# Patient Record
Sex: MALE | Race: WHITE | NOT HISPANIC OR LATINO | URBAN - METROPOLITAN AREA
[De-identification: names, ages, dates, MRNs, and addresses within clinical notes are randomized per-mention and may not be internally consistent; named-entity substitution may affect disease eponyms.]

---

## 2020-12-04 ENCOUNTER — EMERGENCY (EMERGENCY)
Facility: HOSPITAL | Age: 45
LOS: 1 days | Discharge: HOME | End: 2020-12-04
Attending: EMERGENCY MEDICINE | Admitting: EMERGENCY MEDICINE
Payer: COMMERCIAL

## 2020-12-04 VITALS
SYSTOLIC BLOOD PRESSURE: 130 MMHG | RESPIRATION RATE: 18 BRPM | OXYGEN SATURATION: 98 % | DIASTOLIC BLOOD PRESSURE: 85 MMHG | HEART RATE: 71 BPM | TEMPERATURE: 98 F

## 2020-12-04 PROCEDURE — 99283 EMERGENCY DEPT VISIT LOW MDM: CPT

## 2020-12-04 NOTE — ED PROVIDER NOTE - PATIENT PORTAL LINK FT
You can access the FollowMyHealth Patient Portal offered by [facility] by registering at the following website: [email or web address]. By joining [employer]’s FollowMyHealth portal, you will also be able to view your health information using other applications (apps) compatible with our system.

## 2020-12-04 NOTE — ED PROVIDER NOTE - NSFOLLOWUPINSTRUCTIONS_ED_ALL_ED_FT
THE COVID 19 TEST RESULTS  - results may take up to 2-3 days to become available   - if you have consented, you will receive your results electronically   -  you can check [place] [facility] or call [phone] to discuss your results with our nursing staff    Please continue to self quarantine (home isolation) until your result is back and follow instructions accordingly  - positive: complete home isolation for a total of 14 days since day of testing and no more fever with feeling back to baseline   - negative: you will be able to stop home isolation but still practice standard precautions, similar to how you would manage a regular flu/cold.    Return to ER for any worsening symptoms, such as persistent fever >100.4F, shortness of breath, coughing up bloody sputum, chest pain, lethargy, and fainting    Please remember to wash your hands frequently (>20 seconds each time), avoid touching your face, and cover your cough/sneeze.  Always wear a mask when you are outside of your home and practice social distancing.    Only take Tylenol for fever/pain control and avoid NSAIDs (ibuprofen/Advil/Aleve/naproxen) due to potential increased risk of exacerbating COVID-19 infection

## 2020-12-08 DIAGNOSIS — Z20.828 CONTACT WITH AND (SUSPECTED) EXPOSURE TO OTHER VIRAL COMMUNICABLE DISEASES: ICD-10-CM

## 2020-12-19 ENCOUNTER — EMERGENCY (EMERGENCY)
Facility: HOSPITAL | Age: 45
LOS: 1 days | Discharge: HOME | End: 2020-12-19
Attending: EMERGENCY MEDICINE | Admitting: EMERGENCY MEDICINE
Payer: COMMERCIAL

## 2020-12-19 VITALS
HEART RATE: 60 BPM | TEMPERATURE: 98 F | DIASTOLIC BLOOD PRESSURE: 69 MMHG | SYSTOLIC BLOOD PRESSURE: 128 MMHG | OXYGEN SATURATION: 98 % | RESPIRATION RATE: 18 BRPM

## 2020-12-19 DIAGNOSIS — Z20.828 CONTACT WITH AND (SUSPECTED) EXPOSURE TO OTHER VIRAL COMMUNICABLE DISEASES: ICD-10-CM

## 2020-12-19 PROCEDURE — 99283 EMERGENCY DEPT VISIT LOW MDM: CPT

## 2020-12-19 NOTE — ED PROVIDER NOTE - OBJECTIVE STATEMENT
45 male, presenting to the ED requesting COVID-19 screening. Patient is currently asymptomatic and has no other medical complaints at this time. Denies fever, chills, chest pain, SOB.

## 2020-12-19 NOTE — ED PROVIDER NOTE - CLINICAL SUMMARY MEDICAL DECISION MAKING FREE TEXT BOX
Asymptomatic patient here for COVID screening. Risk of exposure is very low. Reviewed vitals and testing plan with the patient. Encouraged to download the follow my health greg for test results.

## 2020-12-19 NOTE — ED PROVIDER NOTE - PATIENT PORTAL LINK FT
You can access the FollowMyHealth Patient Portal offered by [facility] by registering at the following website: [email or web address]. By joining [employer]’s FollowMyHealth portal, you will also be able to view your health information using other applications (apps) compatible with our system.

## 2020-12-20 ENCOUNTER — EMERGENCY (EMERGENCY)
Facility: HOSPITAL | Age: 45
LOS: 1 days | Discharge: HOME | End: 2020-12-20
Attending: EMERGENCY MEDICINE | Admitting: EMERGENCY MEDICINE
Payer: COMMERCIAL

## 2020-12-20 VITALS
OXYGEN SATURATION: 95 % | RESPIRATION RATE: 17 BRPM | HEIGHT: 73 IN | WEIGHT: 195.11 LBS | TEMPERATURE: 98 F | HEART RATE: 63 BPM | DIASTOLIC BLOOD PRESSURE: 62 MMHG | SYSTOLIC BLOOD PRESSURE: 103 MMHG

## 2020-12-20 DIAGNOSIS — Z20.828 CONTACT WITH AND (SUSPECTED) EXPOSURE TO OTHER VIRAL COMMUNICABLE DISEASES: ICD-10-CM

## 2020-12-20 PROCEDURE — 99283 EMERGENCY DEPT VISIT LOW MDM: CPT

## 2020-12-20 NOTE — ED PROVIDER NOTE - PATIENT PORTAL LINK FT
You can access the FollowMyHealth Patient Portal offered by [facility] by registering at the following website: [email or web address]. By joining [employer]’s FollowMyHealth portal, you will also be able to view your health information using other applications (apps) compatible with our system.

## 2020-12-20 NOTE — ED ADULT TRIAGE NOTE - CHIEF COMPLAINT QUOTE
Pt presents to ED for covid test. Pt denies fever/chills/cough/n/v/d/cp or sob, known sick contacts or recent travel. Pt state he is set to travel to [place] on Tuesday. Pt speaking n full sentences without difficulty Pt presents to ED for covid test. Pt denies fever/chills/cough/n/v/d/cp or sob, known sick contacts. Pt state returned from [place] last week and is set to travel to [place] on Tuesday. Pt speaking n full sentences without difficulty

## 2020-12-20 NOTE — ED PROVIDER NOTE - CLINICAL SUMMARY MEDICAL DECISION MAKING FREE TEXT BOX
Asymptomatic patient presents for COVID testing.  Vitals reviewed.  PCR performed in the ED.  Discussed results expectations and reasons for ED return.

## 2020-12-21 LAB — SARS-COV-2 RNA SPEC QL NAA+PROBE: SIGNIFICANT CHANGE UP

## 2021-01-04 ENCOUNTER — EMERGENCY (EMERGENCY)
Facility: HOSPITAL | Age: 46
LOS: 1 days | Discharge: HOME | End: 2021-01-04
Attending: EMERGENCY MEDICINE | Admitting: EMERGENCY MEDICINE
Payer: COMMERCIAL

## 2021-01-04 VITALS
HEIGHT: 73 IN | DIASTOLIC BLOOD PRESSURE: 82 MMHG | HEART RATE: 78 BPM | TEMPERATURE: 98 F | OXYGEN SATURATION: 99 % | SYSTOLIC BLOOD PRESSURE: 132 MMHG | RESPIRATION RATE: 17 BRPM

## 2021-01-04 DIAGNOSIS — Z20.822 CONTACT WITH AND (SUSPECTED) EXPOSURE TO COVID-19: ICD-10-CM

## 2021-01-04 DIAGNOSIS — Z00.00 ENCOUNTER FOR GENERAL ADULT MEDICAL EXAMINATION WITHOUT ABNORMAL FINDINGS: ICD-10-CM

## 2021-01-04 PROCEDURE — 99283 EMERGENCY DEPT VISIT LOW MDM: CPT

## 2021-01-04 NOTE — ED PROVIDER NOTE - NSFOLLOWUPINSTRUCTIONS_ED_ALL_ED_FT
Your test results may take 1-3 days. You will get a text/email.  Please check the patient online portal ([employer] and website) for results. You can create a portal account at [email or web address]. [facility] [place]. If you have old records with [employer] or [facility]  or encounter any difficulties with us you will need to call the HELP line to merge results [phone] (Mon-Fri 8a-5p).    Please follow the instructions on provided coronavirus discharge educational forms and if needed self quarantine for 14 days.     If you test positive for COVID 19:    1. STAY HOME for 14 DAYS  2. Minimize human contact to ONLY ESSENTIAL  3. Every time you wash your hands, sing the HAPPY BIRTHDAY song so you know you're washing long enough.  Make sure to scrub the webspace between your fingers.  4. DRINK 1-3 Liters of fluids day x at least 5 days.  To remain hydrated. Your fatigue, lightheadedness, and body aches will decrease and your fever has a better chance of breaking if you are well hydrated.    5. For your Fever and Body aches takes Tylenol 650-100mg every 4-6h (max 4000mg/day). Try not to use ibuprofen, aspirin or naproxen (Advil, Motrin or Aleve) as these may worsen Coronavirus infection.  6. Use an inhaler for mild shortness of breath and cough  7. RETURN TO THE ER IMMEDIATELY IF YOU HAVE WORSENING SHORTNESS OF BREATH  8. TAKE THE FOLLOWING SUPPLEMENTS DAILY.        VITAMIN C 1000MG ONCE DAILY.        VITAMIN D 200IU ONCE DAILY.        ZINC 50MG ONCE DAILY.

## 2021-01-04 NOTE — ED PROVIDER NOTE - PATIENT PORTAL LINK FT
You can access the FollowMyHealth Patient Portal offered by [facility] by registering at the following website: [email or web address]. By joining [employer]’s FollowMyHealth portal, you will also be able to view your health information using other applications (apps) compatible with our system.

## 2021-01-04 NOTE — ED ADULT TRIAGE NOTE - CAS DISCH CONDITION
Stable
Patient has 3/1 commode and rolling walker at home. Patient will be given hip kit when O.T. performs initial occupational therapy evaluation.

## 2021-01-05 LAB — SARS-COV-2 RNA SPEC QL NAA+PROBE: SIGNIFICANT CHANGE UP

## 2021-02-04 ENCOUNTER — EMERGENCY (EMERGENCY)
Facility: HOSPITAL | Age: 46
LOS: 1 days | Discharge: HOME | End: 2021-02-04
Admitting: EMERGENCY MEDICINE
Payer: COMMERCIAL

## 2021-02-04 VITALS
HEIGHT: 73 IN | SYSTOLIC BLOOD PRESSURE: 129 MMHG | OXYGEN SATURATION: 95 % | RESPIRATION RATE: 18 BRPM | DIASTOLIC BLOOD PRESSURE: 73 MMHG | HEART RATE: 71 BPM | TEMPERATURE: 98 F

## 2021-02-04 DIAGNOSIS — Z20.822 CONTACT WITH AND (SUSPECTED) EXPOSURE TO COVID-19: ICD-10-CM

## 2021-02-04 PROCEDURE — 99282 EMERGENCY DEPT VISIT SF MDM: CPT

## 2021-02-04 NOTE — ED PROVIDER NOTE - OBJECTIVE STATEMENT
44yo otherwise healthy M presents today requesting covid screening. denies any symptoms including fever, chills, n/v/d, abd pain, cp, sob, sore throat, any other concerns. admits return from [place] last night. known covid positive exposures.

## 2021-02-04 NOTE — ED PROVIDER NOTE - PATIENT PORTAL LINK FT
You can access the FollowMyHealth Patient Portal offered by [facility] by registering at the following website: [email or web address]. By joining [employer]’s FollowMyHealth portal, you will also be able to view your health information using other applications (apps) compatible with our system.

## 2021-02-04 NOTE — ED PROVIDER NOTE - CLINICAL SUMMARY MEDICAL DECISION MAKING FREE TEXT BOX
pt presents for screening for covid. pt is asymptomatic. +recent travel . no known sick contacts. swab sent. consents to e results. will d/c. given pre-printed discharge instructions. verbalizes understanding. all questions answered.

## 2021-03-05 ENCOUNTER — EMERGENCY (EMERGENCY)
Facility: HOSPITAL | Age: 46
LOS: 1 days | Discharge: HOME | End: 2021-03-05
Attending: EMERGENCY MEDICINE | Admitting: EMERGENCY MEDICINE
Payer: COMMERCIAL

## 2021-03-05 VITALS
OXYGEN SATURATION: 95 % | RESPIRATION RATE: 16 BRPM | SYSTOLIC BLOOD PRESSURE: 117 MMHG | HEART RATE: 83 BPM | HEIGHT: 73 IN | DIASTOLIC BLOOD PRESSURE: 72 MMHG | TEMPERATURE: 98 F

## 2021-03-05 DIAGNOSIS — Z20.822 CONTACT WITH AND (SUSPECTED) EXPOSURE TO COVID-19: ICD-10-CM

## 2021-03-05 PROBLEM — Z78.9 OTHER SPECIFIED HEALTH STATUS: Chronic | Status: ACTIVE | Noted: 2021-02-04

## 2021-03-05 PROCEDURE — 99282 EMERGENCY DEPT VISIT SF MDM: CPT

## 2021-03-05 NOTE — ED PROVIDER NOTE - PATIENT PORTAL LINK FT
You can access the FollowMyHealth Patient Portal offered by [facility] by registering at the following website: [email or web address]. By joining [employer]’s FollowMyHealth portal, you will also be able to view your health information using other applications (apps) compatible with our system.

## 2021-03-05 NOTE — ED PROVIDER NOTE - OBJECTIVE STATEMENT
44 y/o M presents to the ED requesting to have testing done for COVID-19. Pt recently returned from travels in [place] about 5 days ago. He was tested (-) before his flight. He came to the ED for repeat testing. He denies fevers, chills, coughs, CP, and SOB.

## 2021-03-06 LAB — SARS-COV-2 RNA SPEC QL NAA+PROBE: SIGNIFICANT CHANGE UP

## 2022-06-20 NOTE — ED PROVIDER NOTE - DISPOSITION TYPE
DISCHARGE Prednisone Counseling:  I discussed with the patient the risks of prolonged use of prednisone including but not limited to weight gain, insomnia, osteoporosis, mood changes, diabetes, susceptibility to infection, glaucoma and high blood pressure.  In cases where prednisone use is prolonged, patients should be monitored with blood pressure checks, serum glucose levels and an eye exam.  Additionally, the patient may need to be placed on GI prophylaxis, PCP prophylaxis, and calcium and vitamin D supplementation and/or a bisphosphonate.  The patient verbalized understanding of the proper use and the possible adverse effects of prednisone.  All of the patient's questions and concerns were addressed.

## 2024-01-02 PROBLEM — Z00.00 ENCOUNTER FOR PREVENTIVE HEALTH EXAMINATION: Status: ACTIVE | Noted: 2024-01-02

## 2024-01-03 ENCOUNTER — APPOINTMENT (OUTPATIENT)
Dept: UROLOGY | Facility: CLINIC | Age: 49
End: 2024-01-03
Payer: COMMERCIAL

## 2024-01-03 VITALS
HEIGHT: 73 IN | BODY MASS INDEX: 25.84 KG/M2 | HEART RATE: 76 BPM | SYSTOLIC BLOOD PRESSURE: 122 MMHG | TEMPERATURE: 97.52 F | DIASTOLIC BLOOD PRESSURE: 78 MMHG | OXYGEN SATURATION: 96 % | WEIGHT: 195 LBS

## 2024-01-03 PROCEDURE — 51798 US URINE CAPACITY MEASURE: CPT

## 2024-01-03 PROCEDURE — 81003 URINALYSIS AUTO W/O SCOPE: CPT | Mod: QW

## 2024-01-03 PROCEDURE — 99204 OFFICE O/P NEW MOD 45 MIN: CPT

## 2024-01-05 NOTE — PHYSICAL EXAM
[Normal Appearance] : normal appearance [Well Groomed] : well groomed [General Appearance - In No Acute Distress] : no acute distress [Edema] : no peripheral edema [Respiration, Rhythm And Depth] : normal respiratory rhythm and effort [Exaggerated Use Of Accessory Muscles For Inspiration] : no accessory muscle use [Abdomen Soft] : soft [Abdomen Tenderness] : non-tender [Costovertebral Angle Tenderness] : no ~M costovertebral angle tenderness [Urinary Bladder Findings] : the bladder was normal on palpation [Testes Tenderness] : no tenderness of the testes [Testes Mass (___cm)] : there were no testicular masses [Prostate Enlargement] : the prostate was not enlarged [Prostate Tenderness] : the prostate was not tender [No Prostate Nodules] : no prostate nodules [Normal Station and Gait] : the gait and station were normal for the patient's age [] : no rash [No Focal Deficits] : no focal deficits [Oriented To Time, Place, And Person] : oriented to person, place, and time [Affect] : the affect was normal [Mood] : the mood was normal

## 2024-01-05 NOTE — HISTORY OF PRESENT ILLNESS
[FreeTextEntry1] : 48 year old man with long history of intermittent gross hematuria and hematospermia. Episodes tend to occur after prolonged erections and arousal. No pain with ejaculation or orgasm. He has been treated multiple times for prostatitis with prolonged courses of antibiotics. When he does take antibiotics, the hematospermia takes a long time to resolve. Eventually recurs. Very distressed by this.   He had cystoscopy for hematuria that was unremarkable. He had CT as well that was normal. Has not had prostate MRI. Unsure of prior PSA values.  He has erectile dysfunction. He takes tadalafil for this, somewhat helpful. Hematospermia/hematuria do not occur more often after he takes tadalafil.  He has baseline moderate LUTS that are not particularly bothersome. He has frequency, incomplete emptying, weaker stream. Precribed daily tadalafil for this, but not particularly helpful. Overall, wants to focus on hematospermia.  IPSS 15, QOL 3,  cc  PMH: None PSH: None Meds: Tadalafil NKDA FH: Breast/ovarian cancer, HTN SH: Never drinker, former smoker

## 2024-01-05 NOTE — LETTER BODY
[Dear  ___] : Dear  [unfilled], [Courtesy Letter:] : I had the pleasure of seeing your patient, [unfilled], in my office today. [Please see my note below.] : Please see my note below. [Consult Closing:] : Thank you very much for allowing me to participate in the care of this patient.  If you have any questions, please do not hesitate to contact me. [Sincerely,] : Sincerely, [FreeTextEntry3] : Mckay Jasso MD

## 2024-01-05 NOTE — ASSESSMENT
[FreeTextEntry1] : 48 year old man with long history of intermittent gross hematuria and hematospermia that occurs after arousal. Negative evaluation for hematuria in the past. Differential includes UTI, STD, prostatitis, calcifications, vascular malformations, malignancy. Discussed infectious evaluation. Will plan for prostate MRI for more detailed evaluation of the prostate. If patient has recurrent hematuria, will try to perform cystoscopy at that time to find an identifiable source of bleeding. Can consider finasteride to reduce vascularity of the prostate, however, will need to balance with sexual side effects.   - UA, UCx, GC/CT, ureaplasma, trichomonas  - Semen culture  - Prostate MRI  - Urine cytology, FISH  - PSA

## 2024-01-10 LAB
APPEARANCE: CLEAR
BACTERIA UR CULT: NORMAL
BACTERIA: NEGATIVE /HPF
BILIRUB UR QL STRIP: NORMAL
BILIRUBIN URINE: NEGATIVE
BLOOD URINE: NEGATIVE
C TRACH RRNA SPEC QL NAA+PROBE: NOT DETECTED
CAST: 0 /LPF
CLARITY UR: CLEAR
COLLECTION METHOD: NORMAL
COLOR: YELLOW
EPITHELIAL CELLS: 0 /HPF
GLUCOSE QUALITATIVE U: NEGATIVE MG/DL
GLUCOSE UR-MCNC: NORMAL
HCG UR QL: 0.2 EU/DL
HGB UR QL STRIP.AUTO: NORMAL
HLX UV FISH FINAL REPORT: NORMAL
KETONES UR-MCNC: NORMAL
KETONES URINE: NEGATIVE MG/DL
LEUKOCYTE ESTERASE UR QL STRIP: NORMAL
LEUKOCYTE ESTERASE URINE: NEGATIVE
MICROSCOPIC-UA: NORMAL
MYCOPLASMA GENITALIUM PCR: NEGATIVE
MYCOPLASMA GENITALIUM SRCE: NORMAL
MYCOPLASMA HOMINIS CULTURE: NEGATIVE
N GONORRHOEA RRNA SPEC QL NAA+PROBE: NOT DETECTED
NITRITE UR QL STRIP: NORMAL
NITRITE URINE: NEGATIVE
PH UR STRIP: 8
PH URINE: 8
PROT UR STRIP-MCNC: NORMAL
PROTEIN URINE: NEGATIVE MG/DL
PSA SERPL-MCNC: 1.52 NG/ML
RED BLOOD CELLS URINE: 0 /HPF
SOURCE AMPLIFICATION: NORMAL
SOURCE AMPLIFICATION: NORMAL
SP GR UR STRIP: 1.01
SPECIFIC GRAVITY URINE: 1.01
T VAGINALIS RRNA SPEC QL NAA+PROBE: NOT DETECTED
UREAPLASMA CULTURE: NEGATIVE
URINE CYTOLOGY: NORMAL
UROBILINOGEN URINE: 0.2 MG/DL
WHITE BLOOD CELLS URINE: 0 /HPF

## 2024-01-11 ENCOUNTER — TRANSCRIPTION ENCOUNTER (OUTPATIENT)
Age: 49
End: 2024-01-11

## 2024-01-12 ENCOUNTER — OUTPATIENT (OUTPATIENT)
Dept: OUTPATIENT SERVICES | Facility: HOSPITAL | Age: 49
LOS: 1 days | End: 2024-01-12

## 2024-01-12 ENCOUNTER — APPOINTMENT (OUTPATIENT)
Dept: MRI IMAGING | Facility: CLINIC | Age: 49
End: 2024-01-12
Payer: COMMERCIAL

## 2024-01-12 ENCOUNTER — RESULT REVIEW (OUTPATIENT)
Age: 49
End: 2024-01-12

## 2024-01-12 PROCEDURE — 72197 MRI PELVIS W/O & W/DYE: CPT | Mod: 26

## 2024-01-12 PROCEDURE — 76498P: CUSTOM | Mod: 26

## 2024-01-19 ENCOUNTER — APPOINTMENT (OUTPATIENT)
Dept: UROLOGY | Facility: CLINIC | Age: 49
End: 2024-01-19
Payer: COMMERCIAL

## 2024-01-19 VITALS
WEIGHT: 195 LBS | BODY MASS INDEX: 25.84 KG/M2 | HEIGHT: 73 IN | SYSTOLIC BLOOD PRESSURE: 139 MMHG | DIASTOLIC BLOOD PRESSURE: 82 MMHG | HEART RATE: 89 BPM | TEMPERATURE: 210.56 F

## 2024-01-19 PROCEDURE — 99215 OFFICE O/P EST HI 40 MIN: CPT

## 2024-01-19 RX ORDER — CIPROFLOXACIN HYDROCHLORIDE 500 MG/1
500 TABLET, FILM COATED ORAL
Qty: 56 | Refills: 0 | Status: ACTIVE | COMMUNITY
Start: 2024-01-19 | End: 1900-01-01

## 2024-01-19 NOTE — PHYSICAL EXAM
[Normal Appearance] : normal appearance [Well Groomed] : well groomed [General Appearance - In No Acute Distress] : no acute distress [Edema] : no peripheral edema [Respiration, Rhythm And Depth] : normal respiratory rhythm and effort [Abdomen Soft] : soft [Exaggerated Use Of Accessory Muscles For Inspiration] : no accessory muscle use [Abdomen Tenderness] : non-tender [Costovertebral Angle Tenderness] : no ~M costovertebral angle tenderness [Normal Station and Gait] : the gait and station were normal for the patient's age [] : no rash [No Focal Deficits] : no focal deficits [Oriented To Time, Place, And Person] : oriented to person, place, and time [Affect] : the affect was normal [Mood] : the mood was normal

## 2024-01-19 NOTE — HISTORY OF PRESENT ILLNESS
[FreeTextEntry1] : 1/3/24: 48 year old man with long history of intermittent gross hematuria and hematospermia. Episodes tend to occur after prolonged erections and arousal. No pain with ejaculation or orgasm. He has been treated multiple times for prostatitis with prolonged courses of antibiotics. When he does take antibiotics, the hematospermia takes a long time to resolve. Eventually recurs. Very distressed by this.   He had cystoscopy for hematuria that was unremarkable. He had CT as well that was normal. Has not had prostate MRI. Unsure of prior PSA values.  He has erectile dysfunction. He takes tadalafil for this, somewhat helpful. Hematospermia/hematuria do not occur more often after he takes tadalafil.  He has baseline moderate LUTS that are not particularly bothersome. He has frequency, incomplete emptying, weaker stream. Precribed daily tadalafil for this, but not particularly helpful. Overall, wants to focus on hematospermia.  IPSS 15, QOL 3,  cc  PMH: None PSH: None Meds: Tadalafil NKDA FH: Breast/ovarian cancer, HTN SH: Never drinker, former smoker   1/19/24: F/U to discuss results. UA, UCx, GC/CT, ureaplasma, mycoplasma, trichomonas, urine cytology, FISH all negative. PSA 1.52.   MRI prostate 1/12/24: 26.5 cc gland, no concerning lesions. Bilateral peripheral zone inflammation. Incidental 3.8 cm left femoral neck lesion.  Patient indicates the left femoral neck lesion has thoroughly been evaluated by orthopedic doctor with surveillance performed yearly.   He had additional episode of hematospermia and hematuria after arousal.

## 2024-01-19 NOTE — LETTER BODY
[Dear  ___] : Dear  [unfilled], [Courtesy Letter:] : I had the pleasure of seeing your patient, [unfilled], in my office today. [Consult Closing:] : Thank you very much for allowing me to participate in the care of this patient.  If you have any questions, please do not hesitate to contact me. [Please see my note below.] : Please see my note below. [Sincerely,] : Sincerely, [FreeTextEntry3] : Mckay Jasso MD

## 2024-01-19 NOTE — ASSESSMENT
[FreeTextEntry1] : 48 year old man with long history of intermittent gross hematuria and hematospermia that occurs after arousal. Negative evaluation for hematuria in the past. Recent infectious workup negative, however, patient unable to complete semen culture. Prostate MRI did show inflammation in the peripheral zone consistent with possible prostatitis. Differential includes prostatitis, vascular malformations. We discussed prolonged course of antibiotics for possible prostatitis, as the hematuria/hematospermia have resolved in the past with antibiotics. My preference would be to complete the semen culture prior to initiating antibiotics, however, the patient is very distressed with ongoing symptoms and would like to start treatment as soon as possible. We also discussed finasteride to reduce vascularity of the prostate, however, will need to balance with sexual side effects.   - Cipro BID x 4 weeks  - F/U in 6 weeks

## 2024-01-23 ENCOUNTER — TRANSCRIPTION ENCOUNTER (OUTPATIENT)
Age: 49
End: 2024-01-23

## 2024-02-26 ENCOUNTER — APPOINTMENT (OUTPATIENT)
Dept: UROLOGY | Facility: CLINIC | Age: 49
End: 2024-02-26
Payer: COMMERCIAL

## 2024-02-26 VITALS
TEMPERATURE: 208.4 F | HEIGHT: 73 IN | WEIGHT: 195 LBS | HEART RATE: 76 BPM | DIASTOLIC BLOOD PRESSURE: 83 MMHG | SYSTOLIC BLOOD PRESSURE: 133 MMHG | BODY MASS INDEX: 25.84 KG/M2

## 2024-02-26 PROCEDURE — 99215 OFFICE O/P EST HI 40 MIN: CPT

## 2024-02-26 RX ORDER — TADALAFIL 5 MG/1
5 TABLET ORAL
Qty: 90 | Refills: 3 | Status: ACTIVE | COMMUNITY
Start: 2024-02-26 | End: 1900-01-01

## 2024-02-26 RX ORDER — FINASTERIDE 5 MG/1
5 TABLET, FILM COATED ORAL DAILY
Qty: 90 | Refills: 3 | Status: ACTIVE | COMMUNITY
Start: 2024-02-26 | End: 1900-01-01

## 2024-03-04 ENCOUNTER — NON-APPOINTMENT (OUTPATIENT)
Age: 49
End: 2024-03-04

## 2024-03-04 RX ORDER — TADALAFIL 10 MG/1
10 TABLET, FILM COATED ORAL
Qty: 10 | Refills: 2 | Status: ACTIVE | COMMUNITY
Start: 2024-02-26 | End: 1900-01-01

## 2024-04-01 PROBLEM — R31.0 GROSS HEMATURIA: Status: ACTIVE | Noted: 2024-01-03

## 2024-04-01 PROBLEM — R36.1 HEMATOSPERMIA: Status: ACTIVE | Noted: 2024-01-03

## 2024-04-01 PROBLEM — N52.9 MALE ERECTILE DISORDER: Status: ACTIVE | Noted: 2024-02-26

## 2024-04-01 NOTE — PHYSICAL EXAM
[Normal Appearance] : normal appearance [General Appearance - In No Acute Distress] : no acute distress [Well Groomed] : well groomed [Edema] : no peripheral edema [Respiration, Rhythm And Depth] : normal respiratory rhythm and effort [Exaggerated Use Of Accessory Muscles For Inspiration] : no accessory muscle use [Abdomen Soft] : soft [Abdomen Tenderness] : non-tender [Costovertebral Angle Tenderness] : no ~M costovertebral angle tenderness [Normal Station and Gait] : the gait and station were normal for the patient's age [] : no rash [No Focal Deficits] : no focal deficits [Oriented To Time, Place, And Person] : oriented to person, place, and time [Mood] : the mood was normal [Affect] : the affect was normal

## 2024-04-02 ENCOUNTER — APPOINTMENT (OUTPATIENT)
Dept: UROLOGY | Facility: CLINIC | Age: 49
End: 2024-04-02
Payer: COMMERCIAL

## 2024-04-02 VITALS
SYSTOLIC BLOOD PRESSURE: 120 MMHG | HEIGHT: 73 IN | OXYGEN SATURATION: 95 % | BODY MASS INDEX: 25.84 KG/M2 | TEMPERATURE: 209.84 F | HEART RATE: 67 BPM | WEIGHT: 195 LBS | DIASTOLIC BLOOD PRESSURE: 76 MMHG

## 2024-04-02 DIAGNOSIS — R36.1 HEMATOSPERMIA: ICD-10-CM

## 2024-04-02 DIAGNOSIS — R31.0 GROSS HEMATURIA: ICD-10-CM

## 2024-04-02 DIAGNOSIS — N52.9 MALE ERECTILE DYSFUNCTION, UNSPECIFIED: ICD-10-CM

## 2024-04-02 DIAGNOSIS — R68.82 DECREASED LIBIDO: ICD-10-CM

## 2024-04-02 PROCEDURE — 99214 OFFICE O/P EST MOD 30 MIN: CPT

## 2024-04-02 NOTE — ASSESSMENT
[FreeTextEntry1] : 48 year old man with long history of intermittent gross hematuria and hematospermia that occurs after arousal. Negative evaluation for hematuria in the past. Recent infectious workup negative, however, patient unable to complete semen culture. Prostate MRI did show inflammation in the peripheral zone consistent with possible prostatitis. Differential includes prostatitis, vascular malformations. We attempted 4 weeks of cipro with no change in episodes of hematuria. Given ongoing symptoms, particularly blood in urine after arousal and no blood in semen if he has orgasm after urinating, this would seem to indicate bleeding from prostatic urethra, most likely from telengiectasia, vascular malformation or fragile blood vessels. He started finasteride, and cialis 5 mg daily to help counteract potential worsening of erectile function.   With finasteride, hematuria and hematospermia resolved. I am optimistic that this will continue. He is happy with cialis.   - Continue finasteride 5 mg daily  - Continue cialis 5 mg daily  - Testosterone for low libido  - F/U in 6 months

## 2024-04-02 NOTE — LETTER BODY
[Dear  ___] : Dear  [unfilled], [Please see my note below.] : Please see my note below. [Courtesy Letter:] : I had the pleasure of seeing your patient, [unfilled], in my office today. [Sincerely,] : Sincerely, [Consult Closing:] : Thank you very much for allowing me to participate in the care of this patient.  If you have any questions, please do not hesitate to contact me. [FreeTextEntry3] : Mckay Jasso MD

## 2024-04-02 NOTE — HISTORY OF PRESENT ILLNESS
[FreeTextEntry1] : 1/3/24: 48 year old man with long history of intermittent gross hematuria and hematospermia. Episodes tend to occur after prolonged erections and arousal. No pain with ejaculation or orgasm. He has been treated multiple times for prostatitis with prolonged courses of antibiotics. When he does take antibiotics, the hematospermia takes a long time to resolve. Eventually recurs. Very distressed by this.   He had cystoscopy for hematuria that was unremarkable. He had CT as well that was normal. Has not had prostate MRI. Unsure of prior PSA values.  He has erectile dysfunction. He takes tadalafil for this, somewhat helpful. Hematospermia/hematuria do not occur more often after he takes tadalafil.  He has baseline moderate LUTS that are not particularly bothersome. He has frequency, incomplete emptying, weaker stream. Precribed daily tadalafil for this, but not particularly helpful. Overall, wants to focus on hematospermia.  IPSS 15, QOL 3,  cc  PMH: None PSH: None Meds: Tadalafil NKDA FH: Breast/ovarian cancer, HTN SH: Never drinker, former smoker   1/19/24: F/U to discuss results. UA, UCx, GC/CT, ureaplasma, mycoplasma, trichomonas, urine cytology, FISH all negative. PSA 1.52.   MRI prostate 1/12/24: 26.5 cc gland, no concerning lesions. Bilateral peripheral zone inflammation. Incidental 3.8 cm left femoral neck lesion.  Patient indicates the left femoral neck lesion has thoroughly been evaluated by orthopedic doctor with surveillance performed yearly.   He had additional episode of hematospermia and hematuria after arousal.   2/26/24: Took cipro for 4 weeks with no immediate improvement in symptoms. Continues to get hematuria after arousal. If he urinates prior to orgasm, he does not have blood in semen.  4/2/24: Taking finasteride and tadalafil. Had two sexual encounters in the last month, the first one had very light blood in the urine and semen, the second had no blood in the urine or semen. Very happy with this improvement. Still has low libido. Tadalafil is helping with erections.  IPSS 9, QOL 2

## 2024-04-08 LAB — TESTOST SERPL-MCNC: 529 NG/DL

## 2024-09-29 ENCOUNTER — NON-APPOINTMENT (OUTPATIENT)
Age: 49
End: 2024-09-29

## 2024-09-30 ENCOUNTER — APPOINTMENT (OUTPATIENT)
Dept: UROLOGY | Facility: CLINIC | Age: 49
End: 2024-09-30
Payer: COMMERCIAL

## 2024-09-30 VITALS
TEMPERATURE: 209.84 F | BODY MASS INDEX: 25.84 KG/M2 | HEIGHT: 73 IN | HEART RATE: 74 BPM | SYSTOLIC BLOOD PRESSURE: 136 MMHG | DIASTOLIC BLOOD PRESSURE: 75 MMHG | WEIGHT: 195 LBS

## 2024-09-30 DIAGNOSIS — R36.1 HEMATOSPERMIA: ICD-10-CM

## 2024-09-30 DIAGNOSIS — N52.9 MALE ERECTILE DYSFUNCTION, UNSPECIFIED: ICD-10-CM

## 2024-09-30 DIAGNOSIS — R31.0 GROSS HEMATURIA: ICD-10-CM

## 2024-09-30 PROCEDURE — 99214 OFFICE O/P EST MOD 30 MIN: CPT

## 2024-09-30 NOTE — HISTORY OF PRESENT ILLNESS
[FreeTextEntry1] : 1/3/24: 48 year old man with long history of intermittent gross hematuria and hematospermia. Episodes tend to occur after prolonged erections and arousal. No pain with ejaculation or orgasm. He has been treated multiple times for prostatitis with prolonged courses of antibiotics. When he does take antibiotics, the hematospermia takes a long time to resolve. Eventually recurs. Very distressed by this.   He had cystoscopy for hematuria that was unremarkable. He had CT as well that was normal. Has not had prostate MRI. Unsure of prior PSA values.  He has erectile dysfunction. He takes tadalafil for this, somewhat helpful. Hematospermia/hematuria do not occur more often after he takes tadalafil.  He has baseline moderate LUTS that are not particularly bothersome. He has frequency, incomplete emptying, weaker stream. Precribed daily tadalafil for this, but not particularly helpful. Overall, wants to focus on hematospermia.  IPSS 15, QOL 3,  cc  PMH: None PSH: None Meds: Tadalafil NKDA FH: Breast/ovarian cancer, HTN SH: Never drinker, former smoker   1/19/24: F/U to discuss results. UA, UCx, GC/CT, ureaplasma, mycoplasma, trichomonas, urine cytology, FISH all negative. PSA 1.52.   MRI prostate 1/12/24: 26.5 cc gland, no concerning lesions. Bilateral peripheral zone inflammation. Incidental 3.8 cm left femoral neck lesion.  Patient indicates the left femoral neck lesion has thoroughly been evaluated by orthopedic doctor with surveillance performed yearly.   He had additional episode of hematospermia and hematuria after arousal.   2/26/24: Took cipro for 4 weeks with no immediate improvement in symptoms. Continues to get hematuria after arousal. If he urinates prior to orgasm, he does not have blood in semen.  4/2/24: Taking finasteride and tadalafil. Had two sexual encounters in the last month, the first one had very light blood in the urine and semen, the second had no blood in the urine or semen. Very happy with this improvement. Still has low libido. Tadalafil is helping with erections.  IPSS 9, QOL 2  9/30/24: Had 4 episodes of hematospermia/hematuria after receptive anal intercourse from May to June. More mild than before. Since then, has had receptive anal intercourse with no blood. Overall, happy with the medications still.

## 2024-09-30 NOTE — ASSESSMENT
[FreeTextEntry1] : 48 year old man with long history of intermittent gross hematuria and hematospermia that occurs after arousal. Negative evaluation for hematuria in the past. Recent infectious workup negative, however, patient unable to complete semen culture. Prostate MRI did show inflammation in the peripheral zone consistent with possible prostatitis. Differential includes prostatitis, vascular malformations. We attempted 4 weeks of cipro with no change in episodes of hematuria. Given ongoing symptoms, particularly blood in urine after arousal and no blood in semen if he has orgasm after urinating, this would seem to indicate bleeding from prostatic urethra, most likely from telengiectasia, vascular malformation or fragile blood vessels. He started finasteride, and cialis 5 mg daily to help counteract potential worsening of erectile function.   With finasteride, hematuria and hematospermia have improved significantly. I am optimistic that this will continue. He is happy with cialis.   - Continue finasteride 5 mg daily  - Continue cialis 5 mg daily  - F/U prn

## 2024-11-04 ENCOUNTER — RESULT REVIEW (OUTPATIENT)
Age: 49
End: 2024-11-04

## 2025-05-12 ENCOUNTER — RESULT REVIEW (OUTPATIENT)
Age: 50
End: 2025-05-12

## 2025-05-13 ENCOUNTER — TRANSCRIPTION ENCOUNTER (OUTPATIENT)
Age: 50
End: 2025-05-13

## 2025-07-29 ENCOUNTER — NON-APPOINTMENT (OUTPATIENT)
Age: 50
End: 2025-07-29

## 2025-07-30 LAB
APPEARANCE: CLEAR
BACTERIA UR CULT: NORMAL
BACTERIA: NEGATIVE /HPF
BILIRUBIN URINE: NEGATIVE
BLOOD URINE: NEGATIVE
CAST: 0 /LPF
COLOR: YELLOW
EPITHELIAL CELLS: 0 /HPF
GLUCOSE QUALITATIVE U: NEGATIVE MG/DL
KETONES URINE: NEGATIVE MG/DL
LEUKOCYTE ESTERASE URINE: ABNORMAL
MICROSCOPIC-UA: NORMAL
NITRITE URINE: NEGATIVE
PH URINE: 7.5
PROTEIN URINE: NEGATIVE MG/DL
RED BLOOD CELLS URINE: 1 /HPF
SPECIFIC GRAVITY URINE: 1.02
UROBILINOGEN URINE: 0.2 MG/DL
WHITE BLOOD CELLS URINE: 98 /HPF

## 2025-07-31 LAB
SOURCE AMPLIFICATION: NORMAL
T VAGINALIS RRNA SPEC QL NAA+PROBE: NOT DETECTED

## 2025-08-01 ENCOUNTER — APPOINTMENT (OUTPATIENT)
Dept: UROLOGY | Facility: CLINIC | Age: 50
End: 2025-08-01
Payer: COMMERCIAL

## 2025-08-01 DIAGNOSIS — A54.9 GONOCOCCAL INFECTION, UNSPECIFIED: ICD-10-CM

## 2025-08-01 DIAGNOSIS — R30.0 DYSURIA: ICD-10-CM

## 2025-08-01 LAB
C TRACH RRNA SPEC QL NAA+PROBE: NOT DETECTED
N GONORRHOEA RRNA SPEC QL NAA+PROBE: DETECTED
SOURCE AMPLIFICATION: NORMAL

## 2025-08-01 PROCEDURE — 99214 OFFICE O/P EST MOD 30 MIN: CPT | Mod: 25

## 2025-08-01 RX ADMIN — Medication 1 MG: at 00:00

## 2025-08-04 LAB
M GENITALIUM DNA UR QL NAA+PROBE: NEGATIVE
M HOMINIS DNA SPEC QL NAA+PROBE: NEGATIVE
UREAPLASMA DNA SPEC QL NAA+PROBE: NEGATIVE

## 2025-08-06 LAB
MYCOPLASMA HOMINIS CULTURE: NEGATIVE
UREAPLASMA CULTURE: NEGATIVE